# Patient Record
(demographics unavailable — no encounter records)

---

## 2024-10-17 NOTE — HISTORY OF PRESENT ILLNESS
[FreeTextEntry1] : Red Cage  [de-identified] : Ching Braden 59 y/o F presents for red marks on body.    Lists the following concerns today:  Personal history of skin cancer: No  Family history of skin cancer: No  History of blistering sunburns: No History of tanning bed use: No Uses sunscreen regularly: Yes

## 2024-10-17 NOTE — ASSESSMENT
[FreeTextEntry1] :    Assessment and Plan: Static and Dynamic Rhytides Neuromodulators can be helpful when used early and regularly to attenuate and slow the development of rhytides.    We discussed benefits and rational for use of neuromodulators to improve dynamic and static rhytides. Based on desired improvement in rhytides, I recommended *** units to be distributed to the glabella, forehead and periorbital skin.   INFORMED CONSENT We discussed the risks and benefits of neuromodulators including but not limited to bruising, allergic reaction, infection, muscle weakness, lid or brow ptosis, double vision, facial weakness, headaches, procedural pain, temporary benefit only. Patient understands that treatment with neuromodulators only lessens dynamic wrinkles on a temporary basis, usually for 3 months.  Patient was made aware of variability in patient response and that no guarantee of length of benefit can be made.  All patient's questions were answered fully and to patient satisfaction.   Patient should not lie down for 4 hours after injections and avoid facial massage for next 24-48 hours. Patient understands that the treatment is cosmetic in nature and not covered by insurance.   Written consent obtained.   PROCEDURAL PAUSE Procedural pause conducted to verify: correct patient identity, procedure to be performed and as applicable, correct side and site, correct patient position, and availability of implants, special equipment or special requirements.   Neuromodulator Procedure Note:   Neuromodulator:  Botox  The treated areas of skin were wiped with alcohol after make-up, if present, was removed. Patient was treated with a total of 44 units Glabella:  18 units were injected in aliquots of 2-4-6-4-2 units into the procerus and  muscles Forehead:  10 units were injected in small aliquots of 2-2-2-2-2 units into the frontalis, at least 2 cm above eyebrow Periorbital skin: 16 units total,2-4-2 units were injected  into each lateral orbicularis oculi   Dilution 4 U/0.1 cc Lot Number: *** Expiration Date: ***   Patient tolerated the procedure well without complication.   Possible side-effects and post-care instructions were reviewed and copy was provided to patient.   Self pay-  total charge: 440 (50% discount) Patient paid at registration.   Do not bill.

## 2024-10-17 NOTE — HISTORY OF PRESENT ILLNESS
[FreeTextEntry1] : Red Cage  [de-identified] : Ching Braden 61 y/o F presents for red marks on body.    Lists the following concerns today:  Personal history of skin cancer: No  Family history of skin cancer: No  History of blistering sunburns: No History of tanning bed use: No Uses sunscreen regularly: Yes

## 2024-10-17 NOTE — HISTORY OF PRESENT ILLNESS
[FreeTextEntry1] : Red Cage  [de-identified] : Ching Braden 59 y/o F presents for red marks on body.    Lists the following concerns today:  Personal history of skin cancer: No  Family history of skin cancer: No  History of blistering sunburns: No History of tanning bed use: No Uses sunscreen regularly: Yes